# Patient Record
Sex: FEMALE | Race: BLACK OR AFRICAN AMERICAN | NOT HISPANIC OR LATINO | Employment: FULL TIME | URBAN - METROPOLITAN AREA
[De-identification: names, ages, dates, MRNs, and addresses within clinical notes are randomized per-mention and may not be internally consistent; named-entity substitution may affect disease eponyms.]

---

## 2023-02-09 PROBLEM — J02.9 SORE THROAT: Status: ACTIVE | Noted: 2023-02-09

## 2023-02-09 PROBLEM — R09.A2 GLOBUS SENSATION: Status: ACTIVE | Noted: 2023-02-09

## 2023-02-09 PROBLEM — Z86.2 HISTORY OF IRON DEFICIENCY ANEMIA: Status: ACTIVE | Noted: 2023-02-09

## 2023-02-09 PROBLEM — R07.0 THROAT PAIN: Status: ACTIVE | Noted: 2023-02-09

## 2023-02-09 PROBLEM — T19.2XXA VAGINAL FOREIGN BODY: Status: ACTIVE | Noted: 2023-02-09

## 2023-02-09 PROBLEM — K21.9 LARYNGOPHARYNGEAL REFLUX (LPR): Status: ACTIVE | Noted: 2023-02-09

## 2023-02-09 PROBLEM — B96.89 BACTERIAL VAGINOSIS: Status: ACTIVE | Noted: 2023-02-09

## 2023-02-09 PROBLEM — R10.33 PERIUMBILICAL ABDOMINAL PAIN: Status: ACTIVE | Noted: 2023-02-09

## 2023-02-09 PROBLEM — H65.91 FLUID LEVEL BEHIND TYMPANIC MEMBRANE OF RIGHT EAR: Status: ACTIVE | Noted: 2023-02-09

## 2023-02-09 PROBLEM — N76.0 ACUTE VAGINITIS: Status: ACTIVE | Noted: 2023-02-09

## 2023-02-09 PROBLEM — R49.0 MUSCLE TENSION DYSPHONIA: Status: ACTIVE | Noted: 2023-02-09

## 2023-02-09 PROBLEM — N76.0 BACTERIAL VAGINOSIS: Status: ACTIVE | Noted: 2023-02-09

## 2023-02-09 PROBLEM — B00.1 RECURRENT HERPES LABIALIS: Status: ACTIVE | Noted: 2023-02-09

## 2023-02-09 PROBLEM — N89.8 VAGINAL ODOR: Status: ACTIVE | Noted: 2023-02-09

## 2023-02-09 PROBLEM — M79.2 RADICULAR PAIN IN LEFT ARM: Status: ACTIVE | Noted: 2023-02-09

## 2023-02-09 RX ORDER — VALACYCLOVIR HYDROCHLORIDE 1 G/1
2 TABLET, FILM COATED ORAL 2 TIMES DAILY PRN
COMMUNITY
Start: 2017-03-01

## 2023-02-09 RX ORDER — PANTOPRAZOLE SODIUM 40 MG/1
40 TABLET, DELAYED RELEASE ORAL
COMMUNITY
Start: 2019-12-19 | End: 2023-10-25 | Stop reason: SDUPTHER

## 2023-10-25 ENCOUNTER — OFFICE VISIT (OUTPATIENT)
Dept: PRIMARY CARE | Facility: CLINIC | Age: 48
End: 2023-10-25
Payer: COMMERCIAL

## 2023-10-25 VITALS
SYSTOLIC BLOOD PRESSURE: 116 MMHG | WEIGHT: 143 LBS | OXYGEN SATURATION: 98 % | HEIGHT: 64 IN | DIASTOLIC BLOOD PRESSURE: 72 MMHG | TEMPERATURE: 97.5 F | BODY MASS INDEX: 24.41 KG/M2 | HEART RATE: 71 BPM

## 2023-10-25 DIAGNOSIS — F32.1 MAJOR DEPRESSIVE DISORDER, SINGLE EPISODE, MODERATE (MULTI): ICD-10-CM

## 2023-10-25 DIAGNOSIS — Z11.4 ENCOUNTER FOR SCREENING FOR HIV: ICD-10-CM

## 2023-10-25 DIAGNOSIS — Z00.00 ENCOUNTER FOR WELLNESS EXAMINATION: Primary | ICD-10-CM

## 2023-10-25 DIAGNOSIS — K21.9 LARYNGOPHARYNGEAL REFLUX (LPR): ICD-10-CM

## 2023-10-25 PROBLEM — N76.0 BACTERIAL VAGINOSIS: Status: RESOLVED | Noted: 2023-02-09 | Resolved: 2023-10-25

## 2023-10-25 PROBLEM — N76.0 ACUTE VAGINITIS: Status: RESOLVED | Noted: 2023-02-09 | Resolved: 2023-10-25

## 2023-10-25 PROBLEM — N89.8 VAGINAL ODOR: Status: RESOLVED | Noted: 2023-02-09 | Resolved: 2023-10-25

## 2023-10-25 PROBLEM — R07.0 THROAT PAIN: Status: RESOLVED | Noted: 2023-02-09 | Resolved: 2023-10-25

## 2023-10-25 PROBLEM — R10.33 PERIUMBILICAL ABDOMINAL PAIN: Status: RESOLVED | Noted: 2023-02-09 | Resolved: 2023-10-25

## 2023-10-25 PROBLEM — J02.9 SORE THROAT: Status: RESOLVED | Noted: 2023-02-09 | Resolved: 2023-10-25

## 2023-10-25 PROBLEM — B96.89 BACTERIAL VAGINOSIS: Status: RESOLVED | Noted: 2023-02-09 | Resolved: 2023-10-25

## 2023-10-25 PROBLEM — M79.2 RADICULAR PAIN IN LEFT ARM: Status: RESOLVED | Noted: 2023-02-09 | Resolved: 2023-10-25

## 2023-10-25 PROBLEM — T19.2XXA VAGINAL FOREIGN BODY: Status: RESOLVED | Noted: 2023-02-09 | Resolved: 2023-10-25

## 2023-10-25 PROBLEM — R09.A2 GLOBUS SENSATION: Status: RESOLVED | Noted: 2023-02-09 | Resolved: 2023-10-25

## 2023-10-25 PROBLEM — H65.91 FLUID LEVEL BEHIND TYMPANIC MEMBRANE OF RIGHT EAR: Status: RESOLVED | Noted: 2023-02-09 | Resolved: 2023-10-25

## 2023-10-25 PROCEDURE — 82306 VITAMIN D 25 HYDROXY: CPT

## 2023-10-25 PROCEDURE — 80053 COMPREHEN METABOLIC PANEL: CPT

## 2023-10-25 PROCEDURE — 36415 COLL VENOUS BLD VENIPUNCTURE: CPT

## 2023-10-25 PROCEDURE — 1036F TOBACCO NON-USER: CPT | Performed by: INTERNAL MEDICINE

## 2023-10-25 PROCEDURE — 84443 ASSAY THYROID STIM HORMONE: CPT

## 2023-10-25 PROCEDURE — 99396 PREV VISIT EST AGE 40-64: CPT | Performed by: INTERNAL MEDICINE

## 2023-10-25 PROCEDURE — 99214 OFFICE O/P EST MOD 30 MIN: CPT | Performed by: INTERNAL MEDICINE

## 2023-10-25 PROCEDURE — 85027 COMPLETE CBC AUTOMATED: CPT

## 2023-10-25 PROCEDURE — 87389 HIV-1 AG W/HIV-1&-2 AB AG IA: CPT

## 2023-10-25 PROCEDURE — 82607 VITAMIN B-12: CPT

## 2023-10-25 PROCEDURE — 80061 LIPID PANEL: CPT

## 2023-10-25 RX ORDER — PANTOPRAZOLE SODIUM 40 MG/1
40 TABLET, DELAYED RELEASE ORAL
Qty: 30 TABLET | Refills: 0 | Status: SHIPPED | OUTPATIENT
Start: 2023-10-25 | End: 2023-11-13

## 2023-10-25 RX ORDER — SERTRALINE HYDROCHLORIDE 25 MG/1
25 TABLET, FILM COATED ORAL DAILY
Qty: 60 TABLET | Refills: 0 | Status: SHIPPED | OUTPATIENT
Start: 2023-10-25 | End: 2024-02-06 | Stop reason: ALTCHOICE

## 2023-10-25 NOTE — PROGRESS NOTES
"Subjective   Tessa Waldrop is a 48 y.o. female who presents for Annual Exam.  HPI  She has no significant past medical history other than LPR.     - fibroid procedure, OCT 2022 @ [facility]  - IUD, DEC 2022   - breast lift and tummy tuck in JUN 2022     Recent breakup after about a year, ending in Aug/Sep.  Has been down, just started seeing a therapist.  Having a difficult time getting past it.  Sad all the time, poor concentration, anhedonia, crying a lot, poor sleep, poor appetite.  No SI.  While in vacation in [place], couldn't be present, having times where she couldn't breathe.  Continues to have intrusive thoughts.  Possibly some more frequent wine, Did use some THC gummies for sleep.    Suspect mother had MH issues but also with substance abuse.    Lives with her son (currently at school, [employer]) in [place] .     Previously Exercised 3-4 times per week: cardio/boot camp/weights, etc. Not in two months.  Not in a relationship currently, off OCP, would use condoms if necessary.  Non-smoker, light EtOH use (a glass of red wine daily or every other day), no drug use.  Did use some THC gummies for sleep.  Works full-time for [employer].      Objective   /72   Pulse 71   Temp 36.4 °C (97.5 °F)   Ht 1.613 m (5' 3.5\")   Wt 64.9 kg (143 lb)   SpO2 98%   BMI 24.93 kg/m²    Physical Exam  Gen: NAD, pleasant, A&;Ox3  HEENT: PERRL, EOMI, MMM, OP clear  Neck: supple, no thyromegaly, no JVD, normal carotid upstroke  Pulm: lungs CTAB, good air movement  CV: RRR, no m/r/g, 2+ DP pulses  Abd: NABS, soft, NT, ND no HSM  Ext: no peripheral edema  Neuro: CN II-XII intact, no focal sensory or motor deficits, normal reflexes  Psych: future oriented, muted affect, occasionally tearful  Assessment/Plan   MDD, moderate, initial episode:  - start sertraline 25mg daily and follow-up in 4-6 weeks  - continue with therapist    GERD/LPR: intermittent, triggered by certain foods, carbonated beverages; recommend continue " prn PPI except with flare take for at least 2 weeks daily until it settles down     Health maintenance  -Mammogram - continue annual screening, last: 1/18/2023  -Pap - 7/21/2022, normal HPV and STD negative  -Last colonoscopy: 6/25/2015, OCT 2020 normal, repeat [id number] b/c family hx  -Smoking history: Never  -Counseled regarding diet and exercise: Patient is keeping a healthy lifestyle, discussed cutting back and limiting sweets  -Immunizations: Recommended annual influenza otherwise up-to-date currently  -Followup annually and as needed  Problem List Items Addressed This Visit    None           Shamar Rajan MD

## 2023-10-26 LAB
25(OH)D3 SERPL-MCNC: 26 NG/ML (ref 30–100)
ALBUMIN SERPL BCP-MCNC: 4.3 G/DL (ref 3.4–5)
ALP SERPL-CCNC: 57 U/L (ref 33–110)
ALT SERPL W P-5'-P-CCNC: 12 U/L (ref 7–45)
ANION GAP SERPL CALC-SCNC: 16 MMOL/L (ref 10–20)
AST SERPL W P-5'-P-CCNC: 14 U/L (ref 9–39)
BILIRUB SERPL-MCNC: 0.7 MG/DL (ref 0–1.2)
BUN SERPL-MCNC: 10 MG/DL (ref 6–23)
CALCIUM SERPL-MCNC: 9.8 MG/DL (ref 8.6–10.6)
CHLORIDE SERPL-SCNC: 102 MMOL/L (ref 98–107)
CHOLEST SERPL-MCNC: 148 MG/DL (ref 0–199)
CHOLESTEROL/HDL RATIO: 2.2
CO2 SERPL-SCNC: 28 MMOL/L (ref 21–32)
CREAT SERPL-MCNC: 0.97 MG/DL (ref 0.5–1.05)
ERYTHROCYTE [DISTWIDTH] IN BLOOD BY AUTOMATED COUNT: 14.9 % (ref 11.5–14.5)
GFR SERPL CREATININE-BSD FRML MDRD: 72 ML/MIN/1.73M*2
GLUCOSE SERPL-MCNC: 93 MG/DL (ref 74–99)
HCT VFR BLD AUTO: 44.3 % (ref 36–46)
HDLC SERPL-MCNC: 66.8 MG/DL
HGB BLD-MCNC: 13.5 G/DL (ref 12–16)
HIV 1+2 AB+HIV1 P24 AG SERPL QL IA: NONREACTIVE
LDLC SERPL CALC-MCNC: 69 MG/DL
MCH RBC QN AUTO: 26.2 PG (ref 26–34)
MCHC RBC AUTO-ENTMCNC: 30.5 G/DL (ref 32–36)
MCV RBC AUTO: 86 FL (ref 80–100)
NON HDL CHOLESTEROL: 81 MG/DL (ref 0–149)
NRBC BLD-RTO: 0 /100 WBCS (ref 0–0)
PLATELET # BLD AUTO: 317 X10*3/UL (ref 150–450)
PMV BLD AUTO: 11.5 FL (ref 7.5–11.5)
POTASSIUM SERPL-SCNC: 4.7 MMOL/L (ref 3.5–5.3)
PROT SERPL-MCNC: 7.8 G/DL (ref 6.4–8.2)
RBC # BLD AUTO: 5.16 X10*6/UL (ref 4–5.2)
SODIUM SERPL-SCNC: 141 MMOL/L (ref 136–145)
TRIGL SERPL-MCNC: 59 MG/DL (ref 0–149)
TSH SERPL-ACNC: 2.87 MIU/L (ref 0.44–3.98)
VIT B12 SERPL-MCNC: 1751 PG/ML (ref 211–911)
VLDL: 12 MG/DL (ref 0–40)
WBC # BLD AUTO: 7.4 X10*3/UL (ref 4.4–11.3)

## 2023-11-13 DIAGNOSIS — K21.9 LARYNGOPHARYNGEAL REFLUX (LPR): ICD-10-CM

## 2023-11-13 RX ORDER — PANTOPRAZOLE SODIUM 40 MG/1
TABLET, DELAYED RELEASE ORAL
Qty: 30 TABLET | Refills: 0 | Status: SHIPPED | OUTPATIENT
Start: 2023-11-13 | End: 2024-03-25

## 2023-11-29 ENCOUNTER — APPOINTMENT (OUTPATIENT)
Dept: PRIMARY CARE | Facility: CLINIC | Age: 48
End: 2023-11-29
Payer: COMMERCIAL

## 2024-02-06 ENCOUNTER — OFFICE VISIT (OUTPATIENT)
Dept: PRIMARY CARE | Facility: CLINIC | Age: 49
End: 2024-02-06
Payer: COMMERCIAL

## 2024-02-06 VITALS
TEMPERATURE: 97.3 F | BODY MASS INDEX: 25.33 KG/M2 | DIASTOLIC BLOOD PRESSURE: 57 MMHG | HEIGHT: 63 IN | SYSTOLIC BLOOD PRESSURE: 92 MMHG | HEART RATE: 66 BPM | OXYGEN SATURATION: 96 %

## 2024-02-06 DIAGNOSIS — M79.621 PAIN IN BOTH UPPER ARMS: ICD-10-CM

## 2024-02-06 DIAGNOSIS — G56.03 BILATERAL CARPAL TUNNEL SYNDROME: Primary | ICD-10-CM

## 2024-02-06 DIAGNOSIS — M79.622 PAIN IN BOTH UPPER ARMS: ICD-10-CM

## 2024-02-06 PROCEDURE — 1036F TOBACCO NON-USER: CPT | Performed by: INTERNAL MEDICINE

## 2024-02-06 PROCEDURE — 99213 OFFICE O/P EST LOW 20 MIN: CPT | Performed by: INTERNAL MEDICINE

## 2024-02-06 RX ORDER — IBUPROFEN 800 MG/1
800 TABLET ORAL 3 TIMES DAILY PRN
Qty: 30 TABLET | Refills: 1 | Status: SHIPPED | OUTPATIENT
Start: 2024-02-06

## 2024-02-06 RX ORDER — SERTRALINE HYDROCHLORIDE 100 MG/1
100 TABLET, FILM COATED ORAL DAILY
COMMUNITY
Start: 2024-01-16

## 2024-02-06 RX ORDER — ARM BRACE
1 EACH MISCELLANEOUS NIGHTLY
Qty: 2 EACH | Refills: 0 | Status: SHIPPED | OUTPATIENT
Start: 2024-02-06

## 2024-02-06 RX ORDER — TRAZODONE HYDROCHLORIDE 50 MG/1
50 TABLET ORAL NIGHTLY PRN
COMMUNITY
Start: 2024-01-10

## 2024-02-06 NOTE — PROGRESS NOTES
"Subjective   Tessa Waldrop is a 48 y.o. female who presents for Hand Pain.  HPI  History of bilateral carpal tunnel surgery in 2009/2010.    She has been having worsening pain recently, has numbness and tingling in her first 3 fingers on both hands, right worse than left.  Hands get numb with certain repetitive actions and also often wakes up at night or in the morning with numbness.  Has gone back to recently wearing wrist braces but still bothersome.    More recently has been having pain on the upper arms as well radiating from the elbow to the shoulder but not affecting the joints or motion, achy pain, improved with Advil.    Improved with rest, when she uses 1 arm too much she gets recurrence of the pain and will switch to the other arm.      Objective   BP 92/57   Pulse 66   Temp 36.3 °C (97.3 °F)   Ht 1.6 m (5' 3\")   SpO2 96%   BMI 25.33 kg/m²    Physical Exam  NAD, wearing wrist braces on both arms  No thenar atrophy, strength is intact, subjective decrease sensation to light touch over the first 3 distal pads of the fingers, normal hand strength  Positive Durkan sign  Elbow and shoulder range of motion and strength is normal, no palpable abnormality  Assessment/Plan     Recurrence of bilateral carpal tunnel syndrome with upper arm pain: I suspect the upper arm pain is indirectly related, went over using one of the arms due to increased carpal tunnel syndrome symptoms on the contralateral arm she is getting achiness, this is supported by improvement with rest and NSAID.  Will have her get new wrist braces, trial of 1 week of NSAID at a higher dose and referral to orthopedic surgery for further evaluation.  Problem List Items Addressed This Visit    None           Shamar Rajan MD   "

## 2024-02-16 ENCOUNTER — OFFICE VISIT (OUTPATIENT)
Dept: ORTHOPEDIC SURGERY | Facility: CLINIC | Age: 49
End: 2024-02-16
Payer: COMMERCIAL

## 2024-02-16 DIAGNOSIS — G56.03 BILATERAL CARPAL TUNNEL SYNDROME: ICD-10-CM

## 2024-02-16 DIAGNOSIS — M79.621 PAIN IN BOTH UPPER ARMS: ICD-10-CM

## 2024-02-16 DIAGNOSIS — M79.622 PAIN IN BOTH UPPER ARMS: ICD-10-CM

## 2024-02-16 PROCEDURE — 99203 OFFICE O/P NEW LOW 30 MIN: CPT | Performed by: ORTHOPAEDIC SURGERY

## 2024-02-16 PROCEDURE — 1036F TOBACCO NON-USER: CPT | Performed by: ORTHOPAEDIC SURGERY

## 2024-02-16 PROCEDURE — 99213 OFFICE O/P EST LOW 20 MIN: CPT | Performed by: ORTHOPAEDIC SURGERY

## 2024-02-16 NOTE — PROGRESS NOTES
CHIEF COMPLAINT         Bilateral CTS    ASSESSMENT + PLAN    Bilateral hand and arm tingling and pain 20 years after carpal tunnel surgical release    Had a long discussion about possible causes for these nerve symptoms after previously successful nerve surgery.  This may represent trapping of the nerve and scar, new site of compression, incomplete decompression, or a nonmechanical electrical dysfunction of the nerve.  To help tease these apart, we agreed on obtaining a neuromuscular ultrasound to characterize the shape and gliding of the nerve and surrounding structures.  Continue with night splinting in the meantime, as this can help decrease symptoms.    Follow-up once the study is complete.        The right lateral shoulder pain is rotator cuff tendinitis.  I offered a formal referral to our [facility].  I also suggested working on rotator cuff rehabilitation exercises, which can be found on [employer].    Follow-up with any additional concerns.        HISTORY OF PRESENT ILLNESS       Patient is a 48 y.o. right-hand dominant female , who presents today at suggestion of Dr. Rajan for evaluation of tingling and pain into the fingers of both hands, worse on the right.  She had carpal tunnel surgery in [place] in 2010 and 11 for similar symptoms.  This was successful for many years, but she has had recurrence over the last few years.  No interval trauma.  Symptoms are initially just at night but now lasts into the day as well.  No noted weakness.  Not dropping objects.    She is not diabetic or hypothyroid.  She does not smoke.      REVIEW OF SYSTEMS       A 30-item multi-system Review Of Systems was obtained on today's intake form.  This was reviewed with the patient and is correct.  The pertinent positives and negatives are listed above.  The form has been scanned separately into the medical record.      PHYSICAL EXAM    Constitutional:    Appears stated age. Well-developed and well-nourished  female in no acute distress.  Psychiatric:         Pleasant normal mood and affect. Behavior is appropriate for the situation.   Head:                   Normocephalic and atraumatic.  Eyes:                    Pupils are equal and round.  Cardiovascular:  2+ radial and ulnar pulses. Fingers well-perfused.  Respiratory:        Effort normal. No respiratory distress. Speaking in complete sentences.  Neurologic:       Alert and oriented to person, place, and time.  Skin:                Skin is intact, warm and dry.  Hematologic / Lymphatic:    No lymphedema or lymphangitis.    Extremities / Musculoskeletal:                      Skin of both hands and wrists is intact with no erythema, ecchymosis, or diffuse swelling.  There is a well-healed scar consistent with carpal tunnel surgery on each wrist.  This is nontender with no Tinel's sign.  No erythema, fluctuance, expressible fluid, or other signs concerning for infection.  Full composite finger flexion extension with good intrinsic plus minus posture.  Good sagittal plane balance.  5/5 APB and hand intrinsics with no wasting.  Sensation intact to light touch in all distributions.  Capillary refill less than 2 seconds.  Cervical range of motion is good and does not reproduce chief complaint.  Negative Eliot.      IMAGING / LABS / EMGs           None pertinent      Past Medical History:   Diagnosis Date    Acute vaginitis 08/14/2017    Acute vaginitis    Contact with and (suspected) exposure to infections with a predominantly sexual mode of transmission 04/12/2018    Possible exposure to STD    Irritant contact dermatitis due to other agents 05/05/2016    Irritant contact dermatitis due to other agents    Other specified noninflammatory disorders of cervix uteri     Cervical cyst    Pain in left hip 05/13/2015    Left hip pain    Personal history of diseases of the blood and blood-forming organs and certain disorders involving the immune mechanism 12/14/2021    History  of iron deficiency anemia    Personal history of other infectious and parasitic diseases 04/13/2018    History of trichomoniasis    Plantar wart 12/15/2016    Plantar wart of right foot    Sprain of unspecified parts of lumbar spine and pelvis, initial encounter 08/19/2018    Sprain, lumbosacral    Strain of muscle, fascia and tendon at neck level, initial encounter 05/05/2016    Strain of neck muscle, initial encounter       Medication Documentation Review Audit       Reviewed by Shamar Rajan MD (Physician) on 10/25/23 at 1539      Medication Order Taking? Sig Documenting Provider Last Dose Status   B.animalis,bifid,infantis,long (PROBIOTIC 4X ORAL) [id number]  Take by mouth. Historical Provider, MD  Active   multivitamin capsule [id number]  Take by mouth. Historical Provider, MD  Active   pantoprazole (ProtoNix) 40 mg EC tablet [id number]  Take 1 tablet (40 mg) by mouth. TAKE ONE TABLET BY MOUTH DAILY 30 MINUTES BEFORE A MEAL  CLARIFY FREQUENCY WITH MD Historical Provider, MD  Active   valACYclovir (Valtrex) 1 gram tablet [id number]  Take 2 tablets (2,000 mg) by mouth if needed in the morning and at bedtime (TAKE 2 TABLET every twelve hours for one day at first sign of recurrence of cold sore). TAKE 2 TABLET every twelve hours for one day at first sign of recurrence of cold sore Historical Provider, MD  Active                    No Known Allergies    Social History     Socioeconomic History    Marital status: Single     Spouse name: Not on file    Number of children: Not on file    Years of education: Not on file    Highest education level: Not on file   Occupational History    Not on file   Tobacco Use    Smoking status: Never    Smokeless tobacco: Never   Substance and Sexual Activity    Alcohol use: Not on file    Drug use: Not on file    Sexual activity: Not on file   Other Topics Concern    Not on file   Social History Narrative    Not on file     Social Determinants of Health     Financial Resource Strain: Not on  file   Food Insecurity: Not on file   Transportation Needs: Not on file   Physical Activity: Not on file   Stress: Not on file   Social Connections: Not on file   Intimate Partner Violence: Not on file   Housing Stability: Not on file       Past Surgical History:   Procedure Laterality Date    CARPAL TUNNEL RELEASE  2015    Neuroplasty Decompression Median Nerve At Carpal Tunnel     SECTION, CLASSIC  2015     Section    MOUTH SURGERY  2015    Oral Surgery Tooth Extraction    OTHER SURGICAL HISTORY  2015    Bunion Correction By Cheilectomy         Electronically Signed      GWENDOLYN Diaz MD      Orthopaedic Hand Surgery      [phone]

## 2024-02-16 NOTE — LETTER
March 16, 2024     Shamar Rajan MD  [address]  [facility] [address]    Patient: Tessa Waldrop   YOB: 1975   Date of Visit: 2/16/2024       Dear Dr. Shamar Rajan MD:    Thank you for referring Tessa Waldrop to me for evaluation. Below are my notes for this consultation.  If you have questions, please do not hesitate to call me. I look forward to following your patient along with you.       Sincerely,     Blayne Diaz MD      CC: No Recipients  ______________________________________________________________________________________    CHIEF COMPLAINT         Bilateral CTS    ASSESSMENT + PLAN    Bilateral hand and arm tingling and pain 20 years after carpal tunnel surgical release    Had a long discussion about possible causes for these nerve symptoms after previously successful nerve surgery.  This may represent trapping of the nerve and scar, new site of compression, incomplete decompression, or a nonmechanical electrical dysfunction of the nerve.  To help tease these apart, we agreed on obtaining a neuromuscular ultrasound to characterize the shape and gliding of the nerve and surrounding structures.  Continue with night splinting in the meantime, as this can help decrease symptoms.    Follow-up once the study is complete.        The right lateral shoulder pain is rotator cuff tendinitis.  I offered a formal referral to our [facility]er Group.  I also suggested working on rotator cuff rehabilitation exercises, which can be found on [employer].    Follow-up with any additional concerns.        HISTORY OF PRESENT ILLNESS       Patient is a 48 y.o. right-hand dominant female , who presents today at suggestion of Dr. Rajan for evaluation of tingling and pain into the fingers of both hands, worse on the right.  She had carpal tunnel surgery in [place] in 2010 and 11 for similar symptoms.  This was successful for many years, but she has had recurrence over the last few  years.  No interval trauma.  Symptoms are initially just at night but now lasts into the day as well.  No noted weakness.  Not dropping objects.    She is not diabetic or hypothyroid.  She does not smoke.      REVIEW OF SYSTEMS       A 30-item multi-system Review Of Systems was obtained on today's intake form.  This was reviewed with the patient and is correct.  The pertinent positives and negatives are listed above.  The form has been scanned separately into the medical record.      PHYSICAL EXAM    Constitutional:    Appears stated age. Well-developed and well-nourished female in no acute distress.  Psychiatric:         Pleasant normal mood and affect. Behavior is appropriate for the situation.   Head:                   Normocephalic and atraumatic.  Eyes:                    Pupils are equal and round.  Cardiovascular:  2+ radial and ulnar pulses. Fingers well-perfused.  Respiratory:        Effort normal. No respiratory distress. Speaking in complete sentences.  Neurologic:       Alert and oriented to person, place, and time.  Skin:                Skin is intact, warm and dry.  Hematologic / Lymphatic:    No lymphedema or lymphangitis.    Extremities / Musculoskeletal:                      Skin of both hands and wrists is intact with no erythema, ecchymosis, or diffuse swelling.  There is a well-healed scar consistent with carpal tunnel surgery on each wrist.  This is nontender with no Tinel's sign.  No erythema, fluctuance, expressible fluid, or other signs concerning for infection.  Full composite finger flexion extension with good intrinsic plus minus posture.  Good sagittal plane balance.  5/5 APB and hand intrinsics with no wasting.  Sensation intact to light touch in all distributions.  Capillary refill less than 2 seconds.  Cervical range of motion is good and does not reproduce chief complaint.  Negative Eliot.      IMAGING / LABS / EMGs           None pertinent      Past Medical History:   Diagnosis  Date   • Acute vaginitis 08/14/2017    Acute vaginitis   • Contact with and (suspected) exposure to infections with a predominantly sexual mode of transmission 04/12/2018    Possible exposure to STD   • Irritant contact dermatitis due to other agents 05/05/2016    Irritant contact dermatitis due to other agents   • Other specified noninflammatory disorders of cervix uteri     Cervical cyst   • Pain in left hip 05/13/2015    Left hip pain   • Personal history of diseases of the blood and blood-forming organs and certain disorders involving the immune mechanism 12/14/2021    History of iron deficiency anemia   • Personal history of other infectious and parasitic diseases 04/13/2018    History of trichomoniasis   • Plantar wart 12/15/2016    Plantar wart of right foot   • Sprain of unspecified parts of lumbar spine and pelvis, initial encounter 08/19/2018    Sprain, lumbosacral   • Strain of muscle, fascia and tendon at neck level, initial encounter 05/05/2016    Strain of neck muscle, initial encounter       Medication Documentation Review Audit       Reviewed by Shamar Rajan MD (Physician) on 10/25/23 at 1539      Medication Order Taking? Sig Documenting Provider Last Dose Status   B.animalis,bifid,infantis,long (PROBIOTIC 4X ORAL) [id number]  Take by mouth. Historical Provider, MD  Active   multivitamin capsule [id number]  Take by mouth. Historical Provider, MD  Active   pantoprazole (ProtoNix) 40 mg EC tablet [id number]  Take 1 tablet (40 mg) by mouth. TAKE ONE TABLET BY MOUTH DAILY 30 MINUTES BEFORE A MEAL  CLARIFY FREQUENCY WITH MD Historical Provider, MD  Active   valACYclovir (Valtrex) 1 gram tablet [id number]  Take 2 tablets (2,000 mg) by mouth if needed in the morning and at bedtime (TAKE 2 TABLET every twelve hours for one day at first sign of recurrence of cold sore). TAKE 2 TABLET every twelve hours for one day at first sign of recurrence of cold sore Historical Provider, MD  Active                    No Known  Allergies    Social History     Socioeconomic History   • Marital status: Single     Spouse name: Not on file   • Number of children: Not on file   • Years of education: Not on file   • Highest education level: Not on file   Occupational History   • Not on file   Tobacco Use   • Smoking status: Never   • Smokeless tobacco: Never   Substance and Sexual Activity   • Alcohol use: Not on file   • Drug use: Not on file   • Sexual activity: Not on file   Other Topics Concern   • Not on file   Social History Narrative   • Not on file     Social Determinants of Health     Financial Resource Strain: Not on file   Food Insecurity: Not on file   Transportation Needs: Not on file   Physical Activity: Not on file   Stress: Not on file   Social Connections: Not on file   Intimate Partner Violence: Not on file   Housing Stability: Not on file       Past Surgical History:   Procedure Laterality Date   • CARPAL TUNNEL RELEASE  2015    Neuroplasty Decompression Median Nerve At Carpal Tunnel   •  SECTION, CLASSIC  2015     Section   • MOUTH SURGERY  2015    Oral Surgery Tooth Extraction   • OTHER SURGICAL HISTORY  2015    Bunion Correction By Cheilectomy         Electronically Signed      GWENDOLYN Diaz MD      Orthopaedic Hand Surgery      [phone]

## 2024-03-07 ENCOUNTER — PROCEDURE VISIT (OUTPATIENT)
Dept: NEUROLOGY | Facility: HOSPITAL | Age: 49
End: 2024-03-07
Payer: COMMERCIAL

## 2024-03-07 DIAGNOSIS — R20.0 NUMBNESS AND TINGLING IN BOTH HANDS: Primary | ICD-10-CM

## 2024-03-07 DIAGNOSIS — R20.2 NUMBNESS AND TINGLING IN BOTH HANDS: Primary | ICD-10-CM

## 2024-03-07 PROCEDURE — 76883 US NRV&ACC STRUX 1XTR COMPRE: CPT | Performed by: STUDENT IN AN ORGANIZED HEALTH CARE EDUCATION/TRAINING PROGRAM

## 2024-03-07 NOTE — PROGRESS NOTES
.NEUROMUSCULAR ULTRASOUND OF THE BILATERAL UPPER EXTREMITIES    INDICATION:  Clinical Information: Bilateral carpal tunnel decompression 10 years ago. She has bilateral hand tingling, numbness and pain involving thumb, index and middle finger, for last 5 years which has worsened overtime. This study is to evaluate for bilateral carpal tunnel syndrome.    Neuromuscular ultrasound to be performed:  (a) to evaluate the echotexture and size of the right and left median nerves in the limb with attention to the carpal tunnel;   (b) to assess for any identifiable structural source of right and left median nerve compression;   (c) to assess adjacent structures to the median nerves;  (d) to assess the right and left wrists joints for abnormalities.     HEIGHT: 5 ft 3 in  WEIGHT: 150 lbs.  HANDEDNESS: Right    COMPARISON:  None.    TECHNIQUE:  The bilateral median nerves and accompanying structures were studied throughout their entire anatomic course in the limb from the wrist to the axilla, including real-time cine imaging. The examination was performed using a [employer] P9 ultrasound machine with a 15-6 MHz matrix linear transducer. Both axial and longitudinal views were obtained. The patient was examined supine with the arm extended and supinated. Cross sectional area (CSA) was measured within the epineurium, using the trace method. At locations where repeat measurements were taken, the mean value is reported below. Transverse and longitudinal images were obtained.     FINDINGS:  RIGHT MEDIAN NERVE:  At the right wrist at the distal wrist crease (proximal carpal tunnel), the median nerve CSA was 8.9 mm2 (NL < 10 mm2; borderline 10-13 mm2; ABN > 13 mm2).    The echogenicity of the right median nerve at the wrist was normal. The mobility of the right median nerve with flexion and extension of the fingers was normal. Color Doppler of the right median nerve showed normal median nerve vascularity.  No abnormal tenosynovitis of the  right flexor tendons was noted. Mild fluid collection was noted around flexor carpi radialis tendon at the wrist.    Using a longitudinal scan of the right median nerve at the wrist, a notch sign was not seen under the flexor retinaculum.     A right persistent median artery was not present. A right bifid median nerve was not present. No other abnormal mass or ganglia was appreciated in the right carpal tunnel. With extension of the fingers, there was mild abnormal intrusion of the right flexor digitorum sublimis. With flexion of the fingers, there was no abnormal intrusion of the right lumbrical muscles.    In the mid-forearm, approximately 12 cm proximal to the distal wrist crease, the right median nerve was seen between the flexor digitorum sublimis and flexor digitorum profundus muscles. At the mid-forearm, the right median nerve CSA was 7.2 mm2. The ratio of the right median CSAs between the wrist and forearm (WFR) was 1.2 (NL < 1.5; borderline: 1.5 - 2.0). The echogenicity of the right median nerve in the forearm was normal.    The right median nerve was then followed proximal into the forearm and then to the axilla. The median nerve was normal in size and echogenicity adjacent to the brachial artery. At the antecubital fossa, the median nerve was seen to move medially over the trochlear process with CSA 9.9mm2 at this point.    Scanning the muscles, the echogenicity was normal of the flexor digitorum sublimis, flexor digitorum profundus, flexor pollicis longus, flexor carpi radialis, and pronator teres. The echogenicity of the abductor pollicis brevis was normal.    At the right wrist joint, the radial carpal joint was normal. No abnormal effusion was seen. The flexor carpi radialis tendon was normal. Both the radial and ulnar arteries were well seen and were normal.    LEFT MEDIAN NERVE  Attention was then turned to the left side. At the left wrist at the distal wrist crease (proximal carpal tunnel), the  median nerve CSA was 10.8 mm2 (NL < 10 mm2; borderline 10-13 mm2; ABN > 13 mm2).     The echogenicity of the left median nerve at the wrist was normal. The mobility of the left median nerve with flexion and extension of the fingers was normal. Color Doppler of the left median nerve showed normal median nerve vascularity.  No abnormal tenosynovitis of the left flexor tendons was noted.    Using a longitudinal scan of the left median nerve at the wrist, a notch sign was not seen under the flexor retinaculum.     A left persistent median artery was not present. A left bifid median nerve was not present. No other abnormal mass or ganglia was appreciated in the left carpal tunnel. With extension of the fingers, there was mild abnormal intrusion of the left flexor digitorum sublimis. With flexion of the fingers, there was no abnormal intrusion of the left lumbrical muscles.    In the mid-forearm, approximately 12 cm proximal to the distal wrist crease, the left median nerve was seen between the flexor digitorum sublimis and flexor digitorum profundus muscles. At the mid-forearm, the left median nerve CSA was 8.3 mm2. The ratio of the left median CSAs between the wrist and forearm (WFR) was 1.3 (NL < 1.5; borderline: 1.5 - 2.0). The echogenicity of the left median nerve in the forearm was normal.    The left median nerve was then followed proximal into the forearm and then to the axilla. The median nerve was normal in size and echogenicity adjacent to the brachial artery.     Scanning the muscles, the echogenicity was normal of the flexor digitorum sublimis, flexor digitorum profundus, flexor pollicis longus, flexor carpi radialis, and pronator teres. The echogenicity of the abductor pollicis brevis was normal.    At the left wrist joint, the radial carpal joint was normal. No abnormal effusion was seen. The flexor carpi radialis tendon was normal. Both the radial and ulnar arteries were well seen and were  "normal.      IMPRESSION:  This is a normal neuromuscular ultrasound examination of the bilateral upper extremities.    There was no ultrasound evidence of median neuropathy at the right or left wrist.  In addition, the right and left median nerves were followed through their anatomic course in the limbs from wrist to axilla and were normal above the wrist.    Please note that after carpal tunnel decompression median nerve can continue to be mildly abnormal.  In these cases \"notch sign\" is the most specific sign for ongoing compression.  No notch sign was noted on either right or left wrist.    The other visualized osseous, ligamentous, joint and tendinous structures appeared normal.       Performed by: Samantha Lester MD  Authorized by: Kurt Patiño DO          "

## 2024-03-07 NOTE — LETTER
March 7, 2024     Blayne Diaz MD  [address]  Department Of Orthopedics  [facility] [address]    Patient: Tessa Waldrop   YOB: 1975   Date of Visit: 3/7/2024       Dear Dr. Blayne Diaz MD:    Thank you for referring Tessa Waldrop to me for evaluation. Below are my notes for this consultation.  If you have questions, please do not hesitate to call me. I look forward to following your patient along with you.       Sincerely,     Kurt Patiño, DO      CC: No Recipients  ______________________________________________________________________________________    .NEUROMUSCULAR ULTRASOUND OF THE BILATERAL UPPER EXTREMITIES    INDICATION:  Clinical Information: Bilateral carpal tunnel decompression 10 years ago. She has bilateral hand tingling, numbness and pain involving thumb, index and middle finger, for last 5 years which has worsened overtime. This study is to evaluate for bilateral carpal tunnel syndrome.    Neuromuscular ultrasound to be performed:  (a) to evaluate the echotexture and size of the right and left median nerves in the limb with attention to the carpal tunnel;   (b) to assess for any identifiable structural source of right and left median nerve compression;   (c) to assess adjacent structures to the median nerves;  (d) to assess the right and left wrists joints for abnormalities.     HEIGHT: 5 ft 3 in  WEIGHT: 150 lbs.  HANDEDNESS: Right    COMPARISON:  None.    TECHNIQUE:  The bilateral median nerves and accompanying structures were studied throughout their entire anatomic course in the limb from the wrist to the axilla, including real-time cine imaging. The examination was performed using a [employer] P9 ultrasound machine with a 15-6 MHz matrix linear transducer. Both axial and longitudinal views were obtained. The patient was examined supine with the arm extended and supinated. Cross sectional area (CSA) was measured within the epineurium, using the trace method. At  locations where repeat measurements were taken, the mean value is reported below. Transverse and longitudinal images were obtained.     FINDINGS:  RIGHT MEDIAN NERVE:  At the right wrist at the distal wrist crease (proximal carpal tunnel), the median nerve CSA was 8.9 mm2 (NL < 10 mm2; borderline 10-13 mm2; ABN > 13 mm2).    The echogenicity of the right median nerve at the wrist was normal. The mobility of the right median nerve with flexion and extension of the fingers was normal. Color Doppler of the right median nerve showed normal median nerve vascularity.  No abnormal tenosynovitis of the right flexor tendons was noted. Mild fluid collection was noted around flexor carpi radialis tendon at the wrist.    Using a longitudinal scan of the right median nerve at the wrist, a notch sign was not seen under the flexor retinaculum.     A right persistent median artery was not present. A right bifid median nerve was not present. No other abnormal mass or ganglia was appreciated in the right carpal tunnel. With extension of the fingers, there was mild abnormal intrusion of the right flexor digitorum sublimis. With flexion of the fingers, there was no abnormal intrusion of the right lumbrical muscles.    In the mid-forearm, approximately 12 cm proximal to the distal wrist crease, the right median nerve was seen between the flexor digitorum sublimis and flexor digitorum profundus muscles. At the mid-forearm, the right median nerve CSA was 7.2 mm2. The ratio of the right median CSAs between the wrist and forearm (WFR) was 1.2 (NL < 1.5; borderline: 1.5 - 2.0). The echogenicity of the right median nerve in the forearm was normal.    The right median nerve was then followed proximal into the forearm and then to the axilla. The median nerve was normal in size and echogenicity adjacent to the brachial artery. At the antecubital fossa, the median nerve was seen to move medially over the trochlear process with CSA 9.9mm2 at this  point.    Scanning the muscles, the echogenicity was normal of the flexor digitorum sublimis, flexor digitorum profundus, flexor pollicis longus, flexor carpi radialis, and pronator teres. The echogenicity of the abductor pollicis brevis was normal.    At the right wrist joint, the radial carpal joint was normal. No abnormal effusion was seen. The flexor carpi radialis tendon was normal. Both the radial and ulnar arteries were well seen and were normal.    LEFT MEDIAN NERVE  Attention was then turned to the left side. At the left wrist at the distal wrist crease (proximal carpal tunnel), the median nerve CSA was 10.8 mm2 (NL < 10 mm2; borderline 10-13 mm2; ABN > 13 mm2).     The echogenicity of the left median nerve at the wrist was normal. The mobility of the left median nerve with flexion and extension of the fingers was normal. Color Doppler of the left median nerve showed normal median nerve vascularity.  No abnormal tenosynovitis of the left flexor tendons was noted.    Using a longitudinal scan of the left median nerve at the wrist, a notch sign was not seen under the flexor retinaculum.     A left persistent median artery was not present. A left bifid median nerve was not present. No other abnormal mass or ganglia was appreciated in the left carpal tunnel. With extension of the fingers, there was mild abnormal intrusion of the left flexor digitorum sublimis. With flexion of the fingers, there was no abnormal intrusion of the left lumbrical muscles.    In the mid-forearm, approximately 12 cm proximal to the distal wrist crease, the left median nerve was seen between the flexor digitorum sublimis and flexor digitorum profundus muscles. At the mid-forearm, the left median nerve CSA was 8.3 mm2. The ratio of the left median CSAs between the wrist and forearm (WFR) was 1.3 (NL < 1.5; borderline: 1.5 - 2.0). The echogenicity of the left median nerve in the forearm was normal.    The left median nerve was then  "followed proximal into the forearm and then to the axilla. The median nerve was normal in size and echogenicity adjacent to the brachial artery.     Scanning the muscles, the echogenicity was normal of the flexor digitorum sublimis, flexor digitorum profundus, flexor pollicis longus, flexor carpi radialis, and pronator teres. The echogenicity of the abductor pollicis brevis was normal.    At the left wrist joint, the radial carpal joint was normal. No abnormal effusion was seen. The flexor carpi radialis tendon was normal. Both the radial and ulnar arteries were well seen and were normal.      IMPRESSION:  This is a normal neuromuscular ultrasound examination of the bilateral upper extremities.    There was no ultrasound evidence of median neuropathy at the right or left wrist.  In addition, the right and left median nerves were followed through their anatomic course in the limbs from wrist to axilla and were normal above the wrist.    Please note that after carpal tunnel decompression median nerve can continue to be mildly abnormal.  In these cases \"notch sign\" is the most specific sign for ongoing compression.  No notch sign was noted on either right or left wrist.    The other visualized osseous, ligamentous, joint and tendinous structures appeared normal.       Performed by: Samantha Lester MD  Authorized by: Kurt Patiño DO        "

## 2024-03-24 ENCOUNTER — TELEPHONE (OUTPATIENT)
Dept: ORTHOPEDIC SURGERY | Facility: CLINIC | Age: 49
End: 2024-03-24
Payer: COMMERCIAL

## 2024-03-24 NOTE — TELEPHONE ENCOUNTER
I reached out to the patient this afternoon about the normal result from her Neuromuscular Ultrasound.  They did not see a surgically treatable lesion.    My suggestion at this point would be evaluation by Neurology to see if there is anything that could be offered medically to help with her symptoms.    She will follow-up with me on an as-needed basis.  She does not need to keep her scheduled appointment on Tuesday, March 26.

## 2024-03-25 DIAGNOSIS — K21.9 LARYNGOPHARYNGEAL REFLUX (LPR): ICD-10-CM

## 2024-03-25 RX ORDER — PANTOPRAZOLE SODIUM 40 MG/1
TABLET, DELAYED RELEASE ORAL
Qty: 90 TABLET | Refills: 0 | Status: SHIPPED | OUTPATIENT
Start: 2024-03-25 | End: 2024-04-25 | Stop reason: SDUPTHER

## 2024-03-26 ENCOUNTER — APPOINTMENT (OUTPATIENT)
Dept: ORTHOPEDIC SURGERY | Facility: CLINIC | Age: 49
End: 2024-03-26
Payer: COMMERCIAL

## 2024-04-25 DIAGNOSIS — K21.9 LARYNGOPHARYNGEAL REFLUX (LPR): ICD-10-CM

## 2024-04-25 RX ORDER — PANTOPRAZOLE SODIUM 40 MG/1
40 TABLET, DELAYED RELEASE ORAL 2 TIMES DAILY
Qty: 180 TABLET | Refills: 1 | Status: SHIPPED | OUTPATIENT
Start: 2024-04-25

## 2024-07-07 DIAGNOSIS — B00.1 RECURRENT HERPES LABIALIS: Primary | ICD-10-CM

## 2024-07-08 RX ORDER — VALACYCLOVIR HYDROCHLORIDE 1 G/1
TABLET, FILM COATED ORAL
Qty: 4 TABLET | Refills: 5 | Status: SHIPPED | OUTPATIENT
Start: 2024-07-08

## 2024-10-24 DIAGNOSIS — K21.9 LARYNGOPHARYNGEAL REFLUX (LPR): ICD-10-CM

## 2024-10-26 RX ORDER — PANTOPRAZOLE SODIUM 40 MG/1
40 TABLET, DELAYED RELEASE ORAL 2 TIMES DAILY
Qty: 180 TABLET | Refills: 1 | Status: SHIPPED | OUTPATIENT
Start: 2024-10-26

## 2025-02-25 ENCOUNTER — OFFICE VISIT (OUTPATIENT)
Dept: URGENT CARE | Age: 50
End: 2025-02-25
Payer: COMMERCIAL

## 2025-02-25 DIAGNOSIS — M54.50 ACUTE MIDLINE LOW BACK PAIN, UNSPECIFIED WHETHER SCIATICA PRESENT: Primary | ICD-10-CM

## 2025-02-25 PROCEDURE — 99213 OFFICE O/P EST LOW 20 MIN: CPT | Performed by: STUDENT IN AN ORGANIZED HEALTH CARE EDUCATION/TRAINING PROGRAM

## 2025-02-25 RX ORDER — CYCLOBENZAPRINE HCL 5 MG
5 TABLET ORAL EVERY 8 HOURS PRN
Qty: 30 TABLET | Refills: 0 | Status: SHIPPED | OUTPATIENT
Start: 2025-02-25 | End: 2025-03-07

## 2025-02-25 RX ORDER — NAPROXEN 500 MG/1
500 TABLET ORAL 2 TIMES DAILY PRN
Qty: 20 TABLET | Refills: 0 | Status: SHIPPED | OUTPATIENT
Start: 2025-02-25 | End: 2025-03-07

## 2025-02-25 NOTE — PROGRESS NOTES
Urgent Care Virtual Video Visit    Communication Mode: [ _x_ ] Phone [ ____ ] Video  - Patient's video stopped working ~1 minute into the visit       Patient Location: [place]  Provider Location: [facility], [place]    I have communicated my name. The patient's identity and physical location were verified at the time of this visit. Either the patient or their legal representative has been informed of the risks and benefits of -- and alternatives to -- treatment through a remote evaluation and consents to proceed with the evaluation remotely. This visit was conducted using video and audio.  **If the visit changes from video + audio to audio only (due to the patient being unable to connect to video), please change the last sentence to state audio only.    Video visit completed with realtime synchronous video/audio connection. Informed consent was obtained from the patient. Patient was made aware that my evaluation and diagnosis are limited due to the fact that we are not in the same room during the interview and that this is a virtual encounter that took place via videoconferencing. Patient verbalized understanding.       ASSESMENT AND PLAN    Patient's atraumatic nonradicular back pain is likely 2/2 muscle strain vs less likely muscle spasm vs other etiology. Conservative management with NSAIDs, muscle relaxers PRN, ICE/heat, and lidocaine/menthol patches.  Encourage f/u with PCP in 7-14 days for reevaluation if symptoms worsen or fail to improve. ER if symptoms worsen    Diagnoses and all orders for this visit:  Acute midline low back pain, unspecified whether sciatica present  -     cyclobenzaprine (Flexeril) 5 mg tablet; Take 1 tablet (5 mg) by mouth every 8 hours if needed for muscle spasms (You may take 10mg (2 tabs) with nighttime dose if 5mg (1 tab) doesn't alleviate the pain) for up to 10 days.  -     naproxen (Naprosyn) 500 mg tablet; Take 1 tablet (500 mg) by mouth 2 times a day as needed (pain) for up to  10 days. DO NOT take with other NSAIDs (ex: ibuprofen)        Patient disposition: Home    Electronically signed by Blayne Clifford MD  9:12 AM      HPI:  Patient reports low back pain/top of the buttocks  Started ~2-3 days ago  Denies known injury  Feels stiff, slight stiffness in the right leg as well  Hurts when changing from sitting to standing  Denies hx of back problems  Worse with extension  Denies hx of back surgery  Trying heat and massage  Tried ibuprofen 200mg a couple times - improvement  Denies hx of DM    PHYSICAL EXAM:  RESPIRATORY: no audible cough, no audible wheezing  PSYCH: pleasant     VITALS  Pulse Ox: unavailable  BP: blood pressure cuff unavailable   Pulse: unavailable   RR: unavailable   Temp: unavailable

## 2025-05-12 ENCOUNTER — APPOINTMENT (OUTPATIENT)
Dept: PRIMARY CARE | Facility: CLINIC | Age: 50
End: 2025-05-12
Payer: COMMERCIAL

## 2025-05-21 ENCOUNTER — APPOINTMENT (OUTPATIENT)
Dept: PRIMARY CARE | Facility: CLINIC | Age: 50
End: 2025-05-21
Payer: COMMERCIAL

## 2025-06-18 ENCOUNTER — APPOINTMENT (OUTPATIENT)
Dept: PRIMARY CARE | Facility: CLINIC | Age: 50
End: 2025-06-18
Payer: COMMERCIAL

## 2025-07-03 ENCOUNTER — APPOINTMENT (OUTPATIENT)
Dept: PRIMARY CARE | Facility: CLINIC | Age: 50
End: 2025-07-03
Payer: COMMERCIAL

## 2025-07-03 VITALS
SYSTOLIC BLOOD PRESSURE: 92 MMHG | WEIGHT: 154 LBS | HEART RATE: 66 BPM | BODY MASS INDEX: 26.29 KG/M2 | DIASTOLIC BLOOD PRESSURE: 61 MMHG | HEIGHT: 64 IN

## 2025-07-03 DIAGNOSIS — K21.9 GASTROESOPHAGEAL REFLUX DISEASE, UNSPECIFIED WHETHER ESOPHAGITIS PRESENT: ICD-10-CM

## 2025-07-03 DIAGNOSIS — Z11.3 SCREEN FOR STD (SEXUALLY TRANSMITTED DISEASE): ICD-10-CM

## 2025-07-03 DIAGNOSIS — Z12.11 SCREENING FOR COLORECTAL CANCER: ICD-10-CM

## 2025-07-03 DIAGNOSIS — Z00.00 ENCOUNTER FOR WELLNESS EXAMINATION: Primary | ICD-10-CM

## 2025-07-03 DIAGNOSIS — Z12.12 SCREENING FOR COLORECTAL CANCER: ICD-10-CM

## 2025-07-03 DIAGNOSIS — Z11.4 ENCOUNTER FOR SCREENING FOR HIV: ICD-10-CM

## 2025-07-03 DIAGNOSIS — Z11.59 NEED FOR HEPATITIS C SCREENING TEST: ICD-10-CM

## 2025-07-03 DIAGNOSIS — K21.9 LARYNGOPHARYNGEAL REFLUX (LPR): ICD-10-CM

## 2025-07-03 DIAGNOSIS — Z86.2 HISTORY OF IRON DEFICIENCY ANEMIA: ICD-10-CM

## 2025-07-03 PROCEDURE — 1036F TOBACCO NON-USER: CPT | Performed by: INTERNAL MEDICINE

## 2025-07-03 PROCEDURE — 3008F BODY MASS INDEX DOCD: CPT | Performed by: INTERNAL MEDICINE

## 2025-07-03 PROCEDURE — 99396 PREV VISIT EST AGE 40-64: CPT | Performed by: INTERNAL MEDICINE

## 2025-07-03 ASSESSMENT — PROMIS GLOBAL HEALTH SCALE
RATE_QUALITY_OF_LIFE: VERY GOOD
RATE_AVERAGE_PAIN: 0
EMOTIONAL_PROBLEMS: RARELY
RATE_SOCIAL_SATISFACTION: VERY GOOD
RATE_PHYSICAL_HEALTH: VERY GOOD
RATE_MENTAL_HEALTH: VERY GOOD
CARRYOUT_PHYSICAL_ACTIVITIES: COMPLETELY
RATE_GENERAL_HEALTH: VERY GOOD
RATE_AVERAGE_FATIGUE: MILD
CARRYOUT_SOCIAL_ACTIVITIES: EXCELLENT

## 2025-07-03 NOTE — PATIENT INSTRUCTIONS
- Shingrix (vaccine series of 2 shots to protect against shingles)  - Prevnar 20 or 21 (one time adult pneumonia vaccine)  - Colonoscopy together with upper endoscopy to be scheduled for later this year  - Labs

## 2025-07-03 NOTE — PROGRESS NOTES
"Subjective   Tessa Waldrop is a 49 y.o. female who presents for Annual Exam.  HPI  She has no significant past medical history other than GERD/LPR.     She has been having a lot of GERD symptoms, burning going up into the chest and many foods are triggers.  She has been taking twice daily PPI although on and off but seems like symptoms are never completely controlled.  No nausea or vomiting, no hematochezia or BRBPR or melena.  Symptoms occur fairly regularly, alcohol and acidic foods are especially triggering.  When she is especially careful with diet and taking twice daily PPI then symptoms are more or less controlled.  However, even then she will also get the dry cough on occasion.    Lives in [place], son just graduated from  [employer], looking to go for [employer].   Exercises 3-4 times per week.  Not in a relationship currently.  Non-smoker, light EtOH use, no drug use.   Works full-time for [employer]      Objective   BP 92/61   Pulse 66   Ht 1.626 m (5' 4\")   Wt 69.9 kg (154 lb)   BMI 26.43 kg/m²    Physical Exam  Gen: NAD, pleasant, A&;Ox3  HEENT: PERRL, EOMI, MMM, OP clear  Neck: supple, no thyromegaly, no JVD, normal carotid upstroke  Pulm: lungs CTAB, good air movement  CV: RRR, no m/r/g, 2+ DP pulses  Abd: NABS, soft, NT, ND no HSM  Ext: no peripheral edema  Neuro: CN II-XII intact, no focal sensory or motor deficits, normal reflexes    Assessment/Plan   MDD, moderate: in remission    GERD/LPR: Poorly controlled despite maximal PPI therapy, triggered by certain foods, carbonated beverages; continue PPI, referral for endoscopy     Health maintenance  -Mammogram - continue annual screening, last: 1/18/2023  -Pap -7/10/2024, normal, high-risk HPV negative  -Last colonoscopy: 6/25/2015, OCT 2020 normal, repeat [id number] b/c family hx  -Smoking history: Never  -Counseled regarding diet and exercise  -Immunizations: Recommended annual influenza, Shingrix and Prevnar  -Followup annually and as needed  Problem " List Items Addressed This Visit       Laryngopharyngeal reflux (LPR)    Relevant Orders    Comprehensive Metabolic Panel    CBC and Auto Differential    Lipid Panel    History of iron deficiency anemia    Relevant Orders    Comprehensive Metabolic Panel    CBC and Auto Differential    Lipid Panel     Other Visit Diagnoses         Encounter for wellness examination    -  Primary    Relevant Orders    Comprehensive Metabolic Panel    CBC and Auto Differential    Lipid Panel    HIV 1/2 Antigen/Antibody Screen with Reflex to Confirmation    Syphilis Screen with Reflex      Need for hepatitis C screening test        Relevant Orders    Hepatitis C antibody      Encounter for screening for HIV        Relevant Orders    HIV 1/2 Antigen/Antibody Screen with Reflex to Confirmation      Screen for STD (sexually transmitted disease)        Relevant Orders    HIV 1/2 Antigen/Antibody Screen with Reflex to Confirmation    Syphilis Screen with Reflex                 Shamar Rajan MD

## 2025-07-07 DIAGNOSIS — Z12.11 SPECIAL SCREENING FOR MALIGNANT NEOPLASMS, COLON: ICD-10-CM

## 2025-07-07 RX ORDER — SODIUM, POTASSIUM,MAG SULFATES 17.5-3.13G
SOLUTION, RECONSTITUTED, ORAL ORAL
Qty: 1 EACH | Refills: 0 | Status: SHIPPED | OUTPATIENT
Start: 2025-07-07

## 2025-07-09 LAB
ALBUMIN SERPL-MCNC: 4.7 G/DL (ref 3.6–5.1)
ALP SERPL-CCNC: 68 U/L (ref 37–153)
ALT SERPL-CCNC: 20 U/L (ref 6–29)
ANION GAP SERPL CALCULATED.4IONS-SCNC: 8 MMOL/L (CALC) (ref 7–17)
AST SERPL-CCNC: 22 U/L (ref 10–35)
BASOPHILS # BLD AUTO: 31 CELLS/UL (ref 0–200)
BASOPHILS NFR BLD AUTO: 0.7 %
BILIRUB SERPL-MCNC: 0.8 MG/DL (ref 0.2–1.2)
BUN SERPL-MCNC: 14 MG/DL (ref 7–25)
CALCIUM SERPL-MCNC: 9.9 MG/DL (ref 8.6–10.4)
CHLORIDE SERPL-SCNC: 103 MMOL/L (ref 98–110)
CHOLEST SERPL-MCNC: 169 MG/DL
CHOLEST/HDLC SERPL: 2.4 (CALC)
CO2 SERPL-SCNC: 29 MMOL/L (ref 20–32)
CREAT SERPL-MCNC: 0.97 MG/DL (ref 0.5–1.03)
EGFRCR SERPLBLD CKD-EPI 2021: 71 ML/MIN/1.73M2
EOSINOPHIL # BLD AUTO: 119 CELLS/UL (ref 15–500)
EOSINOPHIL NFR BLD AUTO: 2.7 %
ERYTHROCYTE [DISTWIDTH] IN BLOOD BY AUTOMATED COUNT: 13 % (ref 11–15)
GLUCOSE SERPL-MCNC: 97 MG/DL (ref 65–139)
HCT VFR BLD AUTO: 42.1 % (ref 35–45)
HCV AB SERPL QL IA: NORMAL
HDLC SERPL-MCNC: 71 MG/DL
HGB BLD-MCNC: 13.4 G/DL (ref 11.7–15.5)
HIV 1+2 AB+HIV1 P24 AG SERPL QL IA: NORMAL
HIV 1+2 AB+HIV1 P24 AG SERPL QL IA: NORMAL
LDLC SERPL CALC-MCNC: 86 MG/DL (CALC)
LYMPHOCYTES # BLD AUTO: 2288 CELLS/UL (ref 850–3900)
LYMPHOCYTES NFR BLD AUTO: 52 %
MCH RBC QN AUTO: 26.4 PG (ref 27–33)
MCHC RBC AUTO-ENTMCNC: 31.8 G/DL (ref 32–36)
MCV RBC AUTO: 82.9 FL (ref 80–100)
MONOCYTES # BLD AUTO: 242 CELLS/UL (ref 200–950)
MONOCYTES NFR BLD AUTO: 5.5 %
NEUTROPHILS # BLD AUTO: 1720 CELLS/UL (ref 1500–7800)
NEUTROPHILS NFR BLD AUTO: 39.1 %
NONHDLC SERPL-MCNC: 98 MG/DL (CALC)
PLATELET # BLD AUTO: 264 THOUSAND/UL (ref 140–400)
PMV BLD REES-ECKER: 11.1 FL (ref 7.5–12.5)
POTASSIUM SERPL-SCNC: 4.6 MMOL/L (ref 3.5–5.3)
PROT SERPL-MCNC: 7.8 G/DL (ref 6.1–8.1)
RBC # BLD AUTO: 5.08 MILLION/UL (ref 3.8–5.1)
SODIUM SERPL-SCNC: 140 MMOL/L (ref 135–146)
T PALLIDUM AB SER QL IA: NEGATIVE
TRIGL SERPL-MCNC: 42 MG/DL
WBC # BLD AUTO: 4.4 THOUSAND/UL (ref 3.8–10.8)

## 2025-08-07 ENCOUNTER — APPOINTMENT (OUTPATIENT)
Dept: GASTROENTEROLOGY | Facility: EXTERNAL LOCATION | Age: 50
End: 2025-08-07
Payer: COMMERCIAL

## 2025-08-07 DIAGNOSIS — K31.89 OTHER DISEASES OF STOMACH AND DUODENUM: ICD-10-CM

## 2025-08-07 DIAGNOSIS — Z86.2 HISTORY OF IRON DEFICIENCY ANEMIA: ICD-10-CM

## 2025-08-07 DIAGNOSIS — Z12.12 SCREENING FOR COLORECTAL CANCER: ICD-10-CM

## 2025-08-07 DIAGNOSIS — Z12.11 COLON CANCER SCREENING: ICD-10-CM

## 2025-08-07 DIAGNOSIS — Z12.11 SCREENING FOR COLORECTAL CANCER: ICD-10-CM

## 2025-08-07 DIAGNOSIS — Z80.0 FAMILY HISTORY OF MALIGNANT NEOPLASM OF GASTROINTESTINAL TRACT: ICD-10-CM

## 2025-08-07 DIAGNOSIS — K21.9 GASTROESOPHAGEAL REFLUX DISEASE WITHOUT ESOPHAGITIS: Primary | ICD-10-CM

## 2025-08-07 DIAGNOSIS — K21.9 LARYNGOPHARYNGEAL REFLUX (LPR): ICD-10-CM

## 2025-08-07 DIAGNOSIS — K21.9 GASTROESOPHAGEAL REFLUX DISEASE, UNSPECIFIED WHETHER ESOPHAGITIS PRESENT: ICD-10-CM

## 2025-08-07 PROCEDURE — 45378 DIAGNOSTIC COLONOSCOPY: CPT | Performed by: INTERNAL MEDICINE

## 2025-08-07 PROCEDURE — 43239 EGD BIOPSY SINGLE/MULTIPLE: CPT | Performed by: INTERNAL MEDICINE

## 2025-08-15 DIAGNOSIS — B00.1 RECURRENT HERPES LABIALIS: ICD-10-CM

## 2025-08-15 RX ORDER — DESOGESTREL AND ETHINYL ESTRADIOL 0.15-0.03
KIT ORAL
COMMUNITY

## 2025-08-15 RX ORDER — CYCLOSPORINE 0.5 MG/ML
EMULSION OPHTHALMIC
COMMUNITY

## 2025-08-15 RX ORDER — SERTRALINE HYDROCHLORIDE 50 MG/1
1 TABLET, FILM COATED ORAL
COMMUNITY
Start: 2025-03-16

## 2025-08-15 RX ORDER — LOTEPREDNOL ETABONATE 5 MG/ML
1 SUSPENSION/ DROPS OPHTHALMIC 2 TIMES DAILY
COMMUNITY
Start: 2024-06-18

## 2025-08-15 RX ORDER — ONDANSETRON 4 MG/1
1 TABLET, FILM COATED ORAL EVERY 6 HOURS
COMMUNITY
Start: 2025-05-02

## 2025-08-15 RX ORDER — VALACYCLOVIR HYDROCHLORIDE 1 G/1
TABLET, FILM COATED ORAL
Qty: 4 TABLET | Refills: 6 | Status: SHIPPED | OUTPATIENT
Start: 2025-08-15

## 2025-08-15 RX ORDER — METRONIDAZOLE 500 MG/1
500 TABLET ORAL 2 TIMES DAILY
COMMUNITY
Start: 2024-07-10

## 2025-08-18 DIAGNOSIS — K21.9 LARYNGOPHARYNGEAL REFLUX (LPR): ICD-10-CM

## 2025-08-18 RX ORDER — PANTOPRAZOLE SODIUM 40 MG/1
40 TABLET, DELAYED RELEASE ORAL 2 TIMES DAILY
Qty: 180 TABLET | Refills: 1 | Status: SHIPPED | OUTPATIENT
Start: 2025-08-18